# Patient Record
Sex: FEMALE | URBAN - METROPOLITAN AREA
[De-identification: names, ages, dates, MRNs, and addresses within clinical notes are randomized per-mention and may not be internally consistent; named-entity substitution may affect disease eponyms.]

---

## 2018-05-08 ENCOUNTER — APPOINTMENT (RX ONLY)
Dept: URBAN - METROPOLITAN AREA CLINIC 24 | Facility: CLINIC | Age: 43
Setting detail: DERMATOLOGY
End: 2018-05-08

## 2018-05-08 DIAGNOSIS — L71.0 PERIORAL DERMATITIS: ICD-10-CM

## 2018-05-08 PROBLEM — F41.9 ANXIETY DISORDER, UNSPECIFIED: Status: ACTIVE | Noted: 2018-05-08

## 2018-05-08 PROCEDURE — ? PRESCRIPTION

## 2018-05-08 PROCEDURE — ? COUNSELING

## 2018-05-08 PROCEDURE — ? TREATMENT REGIMEN

## 2018-05-08 PROCEDURE — 99202 OFFICE O/P NEW SF 15 MIN: CPT

## 2018-05-08 RX ORDER — DOXYCYCLINE HYCLATE 100 MG/1
CAPSULE, GELATIN COATED ORAL DAILY
Qty: 90 | Refills: 0 | Status: ERX | COMMUNITY
Start: 2018-05-08

## 2018-05-08 RX ADMIN — DOXYCYCLINE HYCLATE: 100 CAPSULE, GELATIN COATED ORAL at 00:00

## 2018-05-08 ASSESSMENT — LOCATION SIMPLE DESCRIPTION DERM: LOCATION SIMPLE: RIGHT CHEEK

## 2018-05-08 ASSESSMENT — LOCATION DETAILED DESCRIPTION DERM: LOCATION DETAILED: RIGHT MEDIAL BUCCAL CHEEK

## 2018-05-08 ASSESSMENT — LOCATION ZONE DERM: LOCATION ZONE: FACE

## 2018-05-08 NOTE — PROCEDURE: TREATMENT REGIMEN
Continue Regimen: Tretinoin 0.025% cream
Initiate Treatment: Doxycycline 100 mg once daily for three months
Plan: -Recommended patient use gentle fragrance free cleanser and moisturizer\\n-patient was given a goodrx  coupon for doxycycline to be used at B
Detail Level: Simple

## 2021-01-15 ENCOUNTER — APPOINTMENT (RX ONLY)
Dept: URBAN - METROPOLITAN AREA CLINIC 24 | Facility: CLINIC | Age: 46
Setting detail: DERMATOLOGY
End: 2021-01-15

## 2021-01-15 DIAGNOSIS — Z41.9 ENCOUNTER FOR PROCEDURE FOR PURPOSES OTHER THAN REMEDYING HEALTH STATE, UNSPECIFIED: ICD-10-CM

## 2021-01-15 PROCEDURE — ? COSMETIC CONSULTATION: SKIN CARE PRODUCTS AND SERVICES

## 2021-01-15 ASSESSMENT — LOCATION SIMPLE DESCRIPTION DERM
LOCATION SIMPLE: RIGHT CHEEK
LOCATION SIMPLE: LEFT CHEEK

## 2021-01-15 ASSESSMENT — LOCATION DETAILED DESCRIPTION DERM
LOCATION DETAILED: RIGHT CENTRAL MALAR CHEEK
LOCATION DETAILED: LEFT INFERIOR MEDIAL MALAR CHEEK

## 2021-01-15 ASSESSMENT — LOCATION ZONE DERM
LOCATION ZONE: FACE
LOCATION ZONE: FACE

## 2021-01-15 NOTE — HPI: COSMETIC CONSULTATION
Additional History: AM: cream cleanser, argon oil, facial scrub, ice bath, moisturizer spf 50\\nPM: make up remover, cleanser, moisturizer

## 2021-01-19 ENCOUNTER — APPOINTMENT (RX ONLY)
Dept: URBAN - METROPOLITAN AREA CLINIC 24 | Facility: CLINIC | Age: 46
Setting detail: DERMATOLOGY
End: 2021-01-19

## 2021-01-19 DIAGNOSIS — Z41.9 ENCOUNTER FOR PROCEDURE FOR PURPOSES OTHER THAN REMEDYING HEALTH STATE, UNSPECIFIED: ICD-10-CM

## 2021-01-19 PROCEDURE — ? BOTOX (U OR CC)

## 2021-01-19 ASSESSMENT — LOCATION DETAILED DESCRIPTION DERM
LOCATION DETAILED: LEFT SUPERIOR FOREHEAD
LOCATION DETAILED: LEFT LATERAL EYEBROW
LOCATION DETAILED: RIGHT FOREHEAD
LOCATION DETAILED: RIGHT SUPERIOR LATERAL MALAR CHEEK
LOCATION DETAILED: RIGHT MID TEMPLE
LOCATION DETAILED: LEFT FOREHEAD
LOCATION DETAILED: GLABELLA
LOCATION DETAILED: LEFT INFERIOR TEMPLE
LOCATION DETAILED: SUPERIOR MID FOREHEAD
LOCATION DETAILED: LEFT INFERIOR MEDIAL FOREHEAD
LOCATION DETAILED: RIGHT INFERIOR FOREHEAD
LOCATION DETAILED: RIGHT INFERIOR TEMPLE
LOCATION DETAILED: RIGHT INFERIOR MEDIAL FOREHEAD
LOCATION DETAILED: LEFT INFERIOR FOREHEAD

## 2021-01-19 ASSESSMENT — LOCATION SIMPLE DESCRIPTION DERM
LOCATION SIMPLE: LEFT TEMPLE
LOCATION SIMPLE: RIGHT CHEEK
LOCATION SIMPLE: LEFT EYEBROW
LOCATION SIMPLE: GLABELLA
LOCATION SIMPLE: LEFT FOREHEAD
LOCATION SIMPLE: RIGHT TEMPLE
LOCATION SIMPLE: RIGHT FOREHEAD
LOCATION SIMPLE: SUPERIOR FOREHEAD

## 2021-01-19 ASSESSMENT — LOCATION ZONE DERM: LOCATION ZONE: FACE

## 2021-01-19 NOTE — PROCEDURE: BOTOX (U OR CC)
Reconstitution Date (Optional): 1/5/21
Lot #: [id number]
Post-Care Instructions: Patient instructed to not lie down for 4 hours and limit physical activity for 24 hours. Patient instructed not to travel by airplane for 48 hours.
Measure In Units Or Cc's?: units
Expiration Date (Month Year): 08/2023
Detail Level: Detailed
Dilution (U/0.1 Cc): 10
Price Per Unit Or Per Cc In $ (Use Numbers Only, No Special Characters Or $): 13
Consent: Written consent obtained. Risks include but not limited to lid/brow ptosis, bruising, swelling, diplopia, temporary effect, incomplete chemical denervation.
Quantity Per Injection Site (Units Or Cc): 4
Quantity Per Injection Site (Units Or Cc): 2
Quantity Per Injection Site (Units Or Cc): 1

## 2021-02-04 ENCOUNTER — APPOINTMENT (RX ONLY)
Dept: URBAN - METROPOLITAN AREA CLINIC 24 | Facility: CLINIC | Age: 46
Setting detail: DERMATOLOGY
End: 2021-02-04

## 2021-02-04 DIAGNOSIS — Z41.9 ENCOUNTER FOR PROCEDURE FOR PURPOSES OTHER THAN REMEDYING HEALTH STATE, UNSPECIFIED: ICD-10-CM

## 2021-02-04 PROCEDURE — ? FILLERS (CC)

## 2021-02-04 ASSESSMENT — LOCATION ZONE DERM: LOCATION ZONE: FACE

## 2021-02-04 ASSESSMENT — LOCATION SIMPLE DESCRIPTION DERM
LOCATION SIMPLE: LEFT CHEEK
LOCATION SIMPLE: RIGHT TEMPLE
LOCATION SIMPLE: RIGHT CHEEK
LOCATION SIMPLE: LEFT ZYGOMA

## 2021-02-04 ASSESSMENT — LOCATION DETAILED DESCRIPTION DERM
LOCATION DETAILED: RIGHT CENTRAL MALAR CHEEK
LOCATION DETAILED: LEFT CENTRAL ZYGOMA
LOCATION DETAILED: RIGHT CENTRAL TEMPLE
LOCATION DETAILED: LEFT CENTRAL MALAR CHEEK

## 2021-02-04 NOTE — PROCEDURE: FILLERS (CC)
Marionette Lines Filler Volume In Cc: 0
Detail Level: Detailed
Nasolabial Folds Filler Volume In Cc: 1.5
Price (Use Numbers Only, No Special Characters Or $): 278
Post-Care Instructions: Patient instructed to apply ice to reduce swelling.
Expiration Date (Month Year): 09/30/2022
Topical Anesthesia?: BLT cream (benzocaine 20%, lidocaine 6%, tetracaine 4%)
Anesthesia Lot #: [id number]
Consent: Written consent obtained. Risks include but not limited to bruising, beading, irregular texture, ulceration, infection, allergic reaction, scar formation, incomplete augmentation, temporary nature, procedural pain and blindness.
Filler: Radiesse+
Anesthesia Expiration Date (Month Year): 03/17/21
Lot #: [id number]
Quantity Per Injection Site (Cc): 0.6 cc
Quantity Per Injection Site (Cc): 0.15 cc

## 2021-02-26 ENCOUNTER — APPOINTMENT (RX ONLY)
Dept: URBAN - METROPOLITAN AREA CLINIC 24 | Facility: CLINIC | Age: 46
Setting detail: DERMATOLOGY
End: 2021-02-26

## 2021-02-26 DIAGNOSIS — Z41.9 ENCOUNTER FOR PROCEDURE FOR PURPOSES OTHER THAN REMEDYING HEALTH STATE, UNSPECIFIED: ICD-10-CM

## 2021-02-26 PROCEDURE — ? PLATELET RICH PLASMA INJECTION

## 2021-02-26 ASSESSMENT — LOCATION ZONE DERM
LOCATION ZONE: FACE
LOCATION ZONE: FACE

## 2021-02-26 ASSESSMENT — LOCATION DETAILED DESCRIPTION DERM
LOCATION DETAILED: LEFT MEDIAL MALAR CHEEK
LOCATION DETAILED: RIGHT CENTRAL MALAR CHEEK

## 2021-02-26 ASSESSMENT — LOCATION SIMPLE DESCRIPTION DERM
LOCATION SIMPLE: RIGHT CHEEK
LOCATION SIMPLE: LEFT CHEEK

## 2021-02-26 NOTE — PROCEDURE: PLATELET RICH PLASMA INJECTION
Location #2: nose, eyes
Amount Injected At This Location In Cc (Will Not Render If 0): 0
Location #1: cheeks, chin
Location #4: upper lip
Location #3: forehead
Depth In Mm (Will Not Render If 0): 1.2
Show Additional Techniques: Yes
Detail Level: Zone
Depth In Mm (Will Not Render If 0): 0.5
Consent: Written consent obtained, risks reviewed including but not limited to pain, scarring, infection and incomplete improvement.  Patient understands the procedure is cosmetic in nature and will require out of pocket payment.
Indication Override: skin pen
Which Technique?: Default
Depth In Mm (Will Not Render If 0): 0.7
Amount Injected At This Location In Cc (Will Not Render If 0): 0.1
Depth In Mm (Will Not Render If 0): 1
Post-Care Instructions: After the procedure, take precautions agains sun exposure. Do not apply sunscreen for 12 hours after the procedure. Do not apply make-up for 12 hours after the procedure. Avoid alcohol based toners for 10-14 days. After 2-3 days patients can return to their regular skin regimen.
Amount Of Blood Collected (Optional - Include Units): 5 mil.

## 2021-04-13 ENCOUNTER — APPOINTMENT (RX ONLY)
Dept: URBAN - METROPOLITAN AREA CLINIC 24 | Facility: CLINIC | Age: 46
Setting detail: DERMATOLOGY
End: 2021-04-13

## 2021-04-13 DIAGNOSIS — Z41.9 ENCOUNTER FOR PROCEDURE FOR PURPOSES OTHER THAN REMEDYING HEALTH STATE, UNSPECIFIED: ICD-10-CM

## 2021-04-13 PROCEDURE — ? COSMETIC FOLLOW-UP

## 2021-04-13 PROCEDURE — ? TREATMENT REGIMEN

## 2021-04-13 PROCEDURE — ? COSMETIC QUOTE

## 2021-04-13 PROCEDURE — ? BOTOX (U OR CC)

## 2021-04-13 PROCEDURE — ? PHOTO-DOCUMENTATION

## 2021-04-13 ASSESSMENT — LOCATION DETAILED DESCRIPTION DERM
LOCATION DETAILED: RIGHT SUPERIOR LATERAL MALAR CHEEK
LOCATION DETAILED: RIGHT FOREHEAD
LOCATION DETAILED: RIGHT INFERIOR TEMPLE
LOCATION DETAILED: LEFT INFERIOR MEDIAL FOREHEAD
LOCATION DETAILED: RIGHT INFERIOR MEDIAL FOREHEAD
LOCATION DETAILED: GLABELLA
LOCATION DETAILED: NASAL DORSUM
LOCATION DETAILED: LEFT INFERIOR TEMPLE
LOCATION DETAILED: LEFT LATERAL EYEBROW
LOCATION DETAILED: SUPERIOR MID FOREHEAD
LOCATION DETAILED: LEFT SUPERIOR FOREHEAD
LOCATION DETAILED: LEFT FOREHEAD
LOCATION DETAILED: RIGHT INFERIOR FOREHEAD
LOCATION DETAILED: LEFT INFERIOR FOREHEAD
LOCATION DETAILED: RIGHT MID TEMPLE

## 2021-04-13 ASSESSMENT — LOCATION ZONE DERM
LOCATION ZONE: FACE
LOCATION ZONE: NOSE

## 2021-04-13 ASSESSMENT — LOCATION SIMPLE DESCRIPTION DERM
LOCATION SIMPLE: GLABELLA
LOCATION SIMPLE: LEFT TEMPLE
LOCATION SIMPLE: LEFT EYEBROW
LOCATION SIMPLE: NOSE
LOCATION SIMPLE: RIGHT FOREHEAD
LOCATION SIMPLE: SUPERIOR FOREHEAD
LOCATION SIMPLE: RIGHT CHEEK
LOCATION SIMPLE: RIGHT TEMPLE
LOCATION SIMPLE: LEFT FOREHEAD

## 2021-04-13 NOTE — PROCEDURE: COSMETIC QUOTE
Breast Procedure 9 Units: 0
Misc 6 Free Text Discount (In Dollars- Use Only Numbers And Decimals): 0.00
Injectable 1: Voluma
Include Sales Tax On Surgeon's Fees: Yes
Injectable 2 Murillo/Unit (In Dollars- Use Only Numbers And Decimals): 750.00
Send Charges To Patient Encounter: No
Injectable 3 Free Text Discount (In Dollars- Use Only Numbers And Decimals): 75.00
Facility Fee Units (Optional): 1
Injectable 1 Price/Unit (In Dollars- Use Only Numbers And Decimals): 876.00
Injectable 2: Vollure
Detail Level: Zone
Injectable 3: Volbella

## 2021-04-13 NOTE — PROCEDURE: BOTOX (U OR CC)
Reconstitution Date (Optional): 4/8/2021
Lot #: [id number]
Post-Care Instructions: Patient instructed to not lie down for 4 hours and limit physical activity for 24 hours. Patient instructed not to travel by airplane for 48 hours.
Measure In Units Or Cc's?: units
Expiration Date (Month Year): 12/2023
Detail Level: Detailed
Dilution (U/0.1 Cc): 10
Consent: Written consent obtained. Risks include but not limited to lid/brow ptosis, bruising, swelling, diplopia, temporary effect, incomplete chemical denervation.
Quantity Per Injection Site (Units Or Cc): 4
Quantity Per Injection Site (Units Or Cc): 2
Quantity Per Injection Site (Units Or Cc): 1

## 2021-04-13 NOTE — PROCEDURE: COSMETIC FOLLOW-UP
Side Effects Or Complications: None
Detail Level: Zone
Patient Satisfaction: Pleased
Global Improvement: Good
Comments (Free Text): Will schedule pt back for filler NLFs
Treatment (Optional): Filler Injection

## 2021-04-23 ENCOUNTER — APPOINTMENT (RX ONLY)
Dept: URBAN - METROPOLITAN AREA CLINIC 24 | Facility: CLINIC | Age: 46
Setting detail: DERMATOLOGY
End: 2021-04-23

## 2021-04-23 DIAGNOSIS — Z41.9 ENCOUNTER FOR PROCEDURE FOR PURPOSES OTHER THAN REMEDYING HEALTH STATE, UNSPECIFIED: ICD-10-CM

## 2021-04-23 PROCEDURE — ? SKINPEN

## 2021-04-23 ASSESSMENT — LOCATION ZONE DERM
LOCATION ZONE: FACE
LOCATION ZONE: FACE

## 2021-04-23 ASSESSMENT — LOCATION DETAILED DESCRIPTION DERM
LOCATION DETAILED: LEFT INFERIOR LATERAL MALAR CHEEK
LOCATION DETAILED: LEFT MEDIAL MALAR CHEEK
LOCATION DETAILED: RIGHT CENTRAL MALAR CHEEK

## 2021-04-23 ASSESSMENT — LOCATION SIMPLE DESCRIPTION DERM
LOCATION SIMPLE: RIGHT CHEEK
LOCATION SIMPLE: LEFT CHEEK

## 2021-04-23 NOTE — PROCEDURE: SKINPEN
Serum (Optional): PRP
Detail Level: Zone
Treatment Number (Optional): 3
Location #2: eyes, forehead, nose
Depth In Mm: 1
Post-Care Instructions: After the procedure, take precautions agains sun exposure. Do not apply sunscreen for 12 hours after the procedure. Do not apply make-up for 12 hours after the procedure. Avoid alcohol based toners for 10-14 days. After 2-3 days patients can return to their regular skin regimen.\\n\\n* mild- moderate erythema with slight pinpoint bleeding
Depth In Mm: 0.25
Location #4: spot scars
Depth In Mm: 0.5
Depth In Mm: 1.25
Depth In Mm: 1.5
Consent: Written consent obtained, risks reviewed including but not limited to pain, scarring, infection and incomplete improvement.  Patient understands the procedure is cosmetic in nature and will require out of pocket payment.
Location #3: upper lip
Location #1: cheeks, chin

## 2021-05-13 ENCOUNTER — APPOINTMENT (RX ONLY)
Dept: URBAN - METROPOLITAN AREA CLINIC 24 | Facility: CLINIC | Age: 46
Setting detail: DERMATOLOGY
End: 2021-05-13

## 2021-05-13 DIAGNOSIS — Z41.9 ENCOUNTER FOR PROCEDURE FOR PURPOSES OTHER THAN REMEDYING HEALTH STATE, UNSPECIFIED: ICD-10-CM

## 2021-05-13 PROCEDURE — ? FILLERS (CC)

## 2021-05-13 ASSESSMENT — LOCATION DETAILED DESCRIPTION DERM
LOCATION DETAILED: LEFT SUPERIOR VERMILION BORDER
LOCATION DETAILED: LEFT UPPER CUTANEOUS LIP
LOCATION DETAILED: LEFT NASOLABIAL FOLD
LOCATION DETAILED: RIGHT NASOLABIAL FOLD
LOCATION DETAILED: RIGHT INFERIOR MEDIAL MALAR CHEEK
LOCATION DETAILED: RIGHT ORAL COMMISSURE

## 2021-05-13 ASSESSMENT — LOCATION SIMPLE DESCRIPTION DERM
LOCATION SIMPLE: RIGHT CHEEK
LOCATION SIMPLE: LEFT LIP
LOCATION SIMPLE: RIGHT LIP
LOCATION SIMPLE: LEFT LIP
LOCATION SIMPLE: LEFT UPPER LIP

## 2021-05-13 ASSESSMENT — LOCATION ZONE DERM
LOCATION ZONE: FACE
LOCATION ZONE: LIP
LOCATION ZONE: LIP

## 2021-05-13 NOTE — PROCEDURE: FILLERS (CC)
Price (Use Numbers Only, No Special Characters Or $): 585
Vermilion Lips Filler Volume In Cc: 0
Expiration Date (Month Year): 12/26/2022
Post-Care Instructions: Patient instructed to apply ice to reduce swelling and to contact office if patient experiences significant pain or blanching of the skin. Patient was given provider's cell phone number for after hours contact.
Filler: Vollure
Anesthesia Expiration Date (Month Year): 06/29/2021
Detail Level: Detailed
Nasolabial Folds Filler Volume In Cc: 0.6
Consent: Written consent obtained. Risks include but not limited to bruising, beading, irregular texture, ulceration, blindness, infection, allergic reaction, scar formation, incomplete augmentation, temporary nature, procedural pain.
Lot #: [id number]
Marionette Lines Filler Volume In Cc: 0.1
Anesthesia Lot #: [id number]
Topical Anesthesia?: 20% benzocaine, 8% lidocaine, 4% tetracaine
Quantity Per Injection Site (Cc): 0.3 cc
Quantity Per Injection Site (Cc): 0.05 cc

## 2021-05-27 ENCOUNTER — APPOINTMENT (RX ONLY)
Dept: URBAN - METROPOLITAN AREA CLINIC 24 | Facility: CLINIC | Age: 46
Setting detail: DERMATOLOGY
End: 2021-05-27

## 2021-05-27 DIAGNOSIS — Z41.9 ENCOUNTER FOR PROCEDURE FOR PURPOSES OTHER THAN REMEDYING HEALTH STATE, UNSPECIFIED: ICD-10-CM

## 2021-05-27 PROCEDURE — ? OTHER

## 2021-05-27 NOTE — PROCEDURE: OTHER
Render Risk Assessment In Note?: no
Detail Level: Zone
Other (Free Text): Patient rescheduled due to event this weekend.
Note Text (......Xxx Chief Complaint.): This diagnosis correlates with the

## 2021-05-28 ENCOUNTER — RX ONLY (OUTPATIENT)
Age: 46
Setting detail: RX ONLY
End: 2021-05-28

## 2021-05-28 RX ORDER — DOXYCYCLINE 40 MG/1
1 CAPSULE ORAL QAM
Qty: 30 | Refills: 11 | Status: ERX | COMMUNITY
Start: 2021-05-28

## 2021-05-28 RX ORDER — AZELAIC ACID 0.15 G/G
1 AEROSOL, FOAM TOPICAL BID
Qty: 1 | Refills: 11 | Status: ERX | COMMUNITY
Start: 2021-05-28

## 2021-06-01 ENCOUNTER — APPOINTMENT (RX ONLY)
Dept: URBAN - METROPOLITAN AREA CLINIC 24 | Facility: CLINIC | Age: 46
Setting detail: DERMATOLOGY
End: 2021-06-01

## 2021-06-01 DIAGNOSIS — Z41.9 ENCOUNTER FOR PROCEDURE FOR PURPOSES OTHER THAN REMEDYING HEALTH STATE, UNSPECIFIED: ICD-10-CM

## 2021-06-01 DIAGNOSIS — Z71.89 OTHER SPECIFIED COUNSELING: ICD-10-CM

## 2021-06-01 PROCEDURE — ? COUNSELING

## 2021-06-01 PROCEDURE — ? SUNSCREEN RECOMMENDATIONS

## 2021-06-01 PROCEDURE — ? FILLERS (CC)

## 2021-06-01 ASSESSMENT — LOCATION DETAILED DESCRIPTION DERM
LOCATION DETAILED: LEFT SUPERIOR VERMILION LIP
LOCATION DETAILED: LEFT INFERIOR VERMILION LIP
LOCATION DETAILED: LEFT PHILTRAL RIDGE
LOCATION DETAILED: LEFT ORAL COMMISSURE
LOCATION DETAILED: RIGHT ORAL COMMISSURE
LOCATION DETAILED: RIGHT PHILTRAL RIDGE

## 2021-06-01 ASSESSMENT — LOCATION SIMPLE DESCRIPTION DERM
LOCATION SIMPLE: RIGHT LIP
LOCATION SIMPLE: LEFT LIP

## 2021-06-01 ASSESSMENT — LOCATION ZONE DERM: LOCATION ZONE: LIP

## 2021-06-01 NOTE — PROCEDURE: SUNSCREEN RECOMMENDATIONS
Products Recommended: CeraVe SPF 50 Moisturizing Lotion\\nSkinBetter Sports Stick\\nColorescience Sunforgettable [place] SPF 50
Detail Level: Detailed
General Sunscreen Counseling: I recommended a broad spectrum sunscreen with a SPF of 50 or higher.  I explained that SPF 50 sunscreens block approximately 97 percent of the sun's harmful rays.  Chemical sunscreens should be applied at least 15 minutes prior to expected sun exposure (mineral/physical sunscreens work immediately) and then every 2 hours after that as long as sun exposure continues. If swimming or exercising sunscreen should be reapplied every 45 minutes to an hour after getting wet or sweating.  One ounce, or the equivalent of a shot glass full of sunscreen, is adequate to protect the skin not covered by a bathing suit. I also recommended a lip balm with a sunscreen as well. Sun protective clothing can be used in lieu of sunscreen but must be worn the entire time you are exposed to the sun's rays.

## 2021-06-01 NOTE — PROCEDURE: FILLERS (CC)
Vermilion Lips Filler Volume In Cc: 1
Expiration Date (Month Year): 12/06/2022
Post-Care Instructions: Patient instructed to apply ice to reduce swelling and to contact office if patient experiences significant pain or blanching of the skin.
Additional Anesthesia Volume In Cc: 0
Price (Use Numbers Only, No Special Characters Or $): 503
Filler: Juvederm Volbella XC
Topical Anesthesia?: BLT cream (benzocaine 20%, lidocaine 6%, tetracaine 4%)
Consent: Written consent obtained. Risks include but not limited to bruising, beading, irregular texture, ulceration, blindness, infection, allergic reaction, scar formation, incomplete augmentation, temporary nature, procedural pain.
Lot #: [id number]
Detail Level: Detailed
Quantity Per Injection Site (Cc): 0.05 cc
Additional Area 1 Volume In Cc: 0.3
Additional Area 1 Location: [facility]
Expiration Date (Month Year): 12/26/2022
Lot #: [id number]
Filler: Juvederm Vollure XC
Quantity Per Injection Site (Cc): 0.15 cc
Quantity Per Injection Site (Cc): 0.2 cc

## 2021-06-01 NOTE — PROCEDURE: MIPS QUALITY
Quality 110: Preventive Care And Screening: Influenza Immunization: Influenza Immunization Administered during Influenza season
Detail Level: Detailed
Quality 226: Preventive Care And Screening: Tobacco Use: Screening And Cessation Intervention: Patient screened for tobacco use and is an ex/non-smoker
Quality 130: Documentation Of Current Medications In The Medical Record: Current Medications Documented
Quality 431: Preventive Care And Screening: Unhealthy Alcohol Use - Screening: Patient screened for unhealthy alcohol use using a single question and scores less than 2 times per year
no

## 2021-06-10 ENCOUNTER — APPOINTMENT (RX ONLY)
Dept: URBAN - METROPOLITAN AREA CLINIC 24 | Facility: CLINIC | Age: 46
Setting detail: DERMATOLOGY
End: 2021-06-10

## 2021-06-10 DIAGNOSIS — Z41.9 ENCOUNTER FOR PROCEDURE FOR PURPOSES OTHER THAN REMEDYING HEALTH STATE, UNSPECIFIED: ICD-10-CM

## 2021-06-10 PROCEDURE — ? PHOTO-DOCUMENTATION

## 2021-07-08 ENCOUNTER — APPOINTMENT (RX ONLY)
Dept: URBAN - METROPOLITAN AREA CLINIC 24 | Facility: CLINIC | Age: 46
Setting detail: DERMATOLOGY
End: 2021-07-08

## 2021-07-08 DIAGNOSIS — Z71.89 OTHER SPECIFIED COUNSELING: ICD-10-CM

## 2021-07-08 DIAGNOSIS — Z41.9 ENCOUNTER FOR PROCEDURE FOR PURPOSES OTHER THAN REMEDYING HEALTH STATE, UNSPECIFIED: ICD-10-CM

## 2021-07-08 PROCEDURE — ? COUNSELING

## 2021-07-08 PROCEDURE — ? BOTOX (U OR CC)

## 2021-07-08 PROCEDURE — ? SUNSCREEN RECOMMENDATIONS

## 2021-07-08 ASSESSMENT — LOCATION SIMPLE DESCRIPTION DERM
LOCATION SIMPLE: SUPERIOR FOREHEAD
LOCATION SIMPLE: NOSE
LOCATION SIMPLE: LEFT TEMPLE
LOCATION SIMPLE: RIGHT FOREHEAD
LOCATION SIMPLE: GLABELLA
LOCATION SIMPLE: LEFT EYEBROW
LOCATION SIMPLE: RIGHT CHEEK
LOCATION SIMPLE: LEFT FOREHEAD
LOCATION SIMPLE: RIGHT TEMPLE

## 2021-07-08 ASSESSMENT — LOCATION ZONE DERM
LOCATION ZONE: NOSE
LOCATION ZONE: FACE

## 2021-07-08 ASSESSMENT — LOCATION DETAILED DESCRIPTION DERM
LOCATION DETAILED: RIGHT MID TEMPLE
LOCATION DETAILED: NASAL DORSUM
LOCATION DETAILED: RIGHT INFERIOR TEMPLE
LOCATION DETAILED: LEFT LATERAL EYEBROW
LOCATION DETAILED: LEFT INFERIOR MEDIAL FOREHEAD
LOCATION DETAILED: LEFT INFERIOR TEMPLE
LOCATION DETAILED: RIGHT INFERIOR MEDIAL FOREHEAD
LOCATION DETAILED: LEFT FOREHEAD
LOCATION DETAILED: GLABELLA
LOCATION DETAILED: RIGHT SUPERIOR LATERAL MALAR CHEEK
LOCATION DETAILED: RIGHT FOREHEAD
LOCATION DETAILED: LEFT SUPERIOR FOREHEAD
LOCATION DETAILED: SUPERIOR MID FOREHEAD
LOCATION DETAILED: RIGHT INFERIOR FOREHEAD
LOCATION DETAILED: LEFT INFERIOR FOREHEAD

## 2021-07-08 NOTE — PROCEDURE: SUNSCREEN RECOMMENDATIONS
Products Recommended: CeraVe SPF 50 Moisturizing Lotion\\nSkinBetter Sports Stick\\nColorescience Sunforgettable [place] SPF 50
Detail Level: Detailed
General Sunscreen Counseling: I recommended a broad spectrum sunscreen with a SPF of 50 or higher.  I explained that SPF 50 sunscreens block approximately 97 percent of the sun's harmful rays.  Chemical sunscreens should be applied at least 15 minutes prior to expected sun exposure (mineral/physical sunscreens work immediately) and then every 2 hours after that as long as sun exposure continues. If swimming or exercising sunscreen should be reapplied every 45 minutes to an hour after getting wet or sweating.  One ounce, or the equivalent of a shot glass full of sunscreen, is adequate to protect the skin not covered by a bathing suit. I also recommended a lip balm with a sunscreen as well. Sun protective clothing can be used in lieu of sunscreen but must be worn the entire time you are exposed to the sun's rays.

## 2021-07-08 NOTE — PROCEDURE: BOTOX (U OR CC)
Reconstitution Date (Optional): 6/10/2021
Lot #: [id number]
Post-Care Instructions: Patient instructed to not lie down for 4 hours and limit physical activity for 24 hours. Patient instructed not to travel by airplane for 48 hours.
Measure In Units Or Cc's?: units
Expiration Date (Month Year): 9/30/2023
Detail Level: Detailed
Dilution (U/0.1 Cc): 10
Price Per Unit Or Per Cc In $ (Use Numbers Only, No Special Characters Or $): 13
Consent: Written consent obtained. Risks include but not limited to lid/brow ptosis, bruising, swelling, diplopia, temporary effect, incomplete chemical denervation.
Quantity Per Injection Site (Units Or Cc): 4
Quantity Per Injection Site (Units Or Cc): 2
Quantity Per Injection Site (Units Or Cc): 1

## 2021-07-13 ENCOUNTER — APPOINTMENT (RX ONLY)
Dept: URBAN - METROPOLITAN AREA CLINIC 24 | Facility: CLINIC | Age: 46
Setting detail: DERMATOLOGY
End: 2021-07-13

## 2021-07-13 DIAGNOSIS — Z41.9 ENCOUNTER FOR PROCEDURE FOR PURPOSES OTHER THAN REMEDYING HEALTH STATE, UNSPECIFIED: ICD-10-CM

## 2021-07-13 PROCEDURE — ? MICRONEEDLING

## 2021-07-13 PROCEDURE — ? PLATELET RICH PLASMA INJECTION

## 2021-07-13 ASSESSMENT — LOCATION ZONE DERM
LOCATION ZONE: FACE
LOCATION ZONE: FACE

## 2021-07-13 ASSESSMENT — LOCATION DETAILED DESCRIPTION DERM
LOCATION DETAILED: RIGHT INFERIOR CENTRAL MALAR CHEEK
LOCATION DETAILED: RIGHT INFERIOR MEDIAL MALAR CHEEK
LOCATION DETAILED: LEFT INFERIOR MEDIAL MALAR CHEEK
LOCATION DETAILED: RIGHT CENTRAL MALAR CHEEK

## 2021-07-13 ASSESSMENT — LOCATION SIMPLE DESCRIPTION DERM
LOCATION SIMPLE: LEFT CHEEK
LOCATION SIMPLE: RIGHT CHEEK
LOCATION SIMPLE: RIGHT CHEEK

## 2021-07-13 NOTE — PROCEDURE: PLATELET RICH PLASMA INJECTION
Depth In Mm (Will Not Render If 0): 0
Depth In Mm (Will Not Render If 0): 1
Depth In Mm (Will Not Render If 0): 0.5
Amount Of Blood Processed (Optional - Include Units): 1 tube
Depth In Mm (Will Not Render If 0): 0.3
Detail Level: Zone
Depth In Mm (Will Not Render If 0): 1.2
Consent: Written consent obtained, risks reviewed including but not limited to pain, scarring, infection and incomplete improvement.  Patient understands the procedure is cosmetic in nature and will require out of pocket payment.
Amount Injected At This Location In Cc (Will Not Render If 0): 0.1
Which Technique?: Default
Show Additional Techniques: Yes
Post-Care Instructions: After the procedure, take precautions agains sun exposure. Do not apply sunscreen for 12 hours after the procedure. Do not apply make-up for 12 hours after the procedure. Avoid alcohol based toners for 10-14 days. After 2-3 days patients can return to their regular skin regimen.
Treatment Number (Optional): 4

## 2021-07-13 NOTE — PROCEDURE: MICRONEEDLING
Depth In Mm (Location #3): 0.75
Location #2: forehead, nose, eyes
Infusions (Optional): hyaluronic acid
Post-Care Instructions: After the procedure, take precautions agains sun exposure. Do not apply sunscreen for 12 hours after the procedure. Do not apply make-up for 12 hours after the procedure. Avoid alcohol based toners for 10-14 days. After 2-3 days patients can return to their regular skin\\n*moderate erythema, slight pinpoint bleeding
Depth In Mm (Location #5): 1
Depth In Mm (Location #2): 0.5
Location #1: cheeks, chin
Topical Anesthesia?: BLT cream (benzocaine 20%, lidocaine 6%, tetracaine 4%)
Length Of Topical Anesthesia Application (Optional): 30 minutes
Consent: Written consent obtained, risks reviewed including but not limited to pain, scarring, infection and incomplete improvement.  Patient understands the procedure is cosmetic in nature and will require out of pocket\\n\\n*pt. Had even, moderate erythema and moderate pin point bleeding.
Detail Level: Zone
Treatment Number (Optional): 4
Location #3: upper lip
Depth In Mm (Location #1): 1.25

## 2021-09-02 ENCOUNTER — APPOINTMENT (RX ONLY)
Dept: URBAN - METROPOLITAN AREA CLINIC 24 | Facility: CLINIC | Age: 46
Setting detail: DERMATOLOGY
End: 2021-09-02

## 2021-09-02 DIAGNOSIS — Z41.9 ENCOUNTER FOR PROCEDURE FOR PURPOSES OTHER THAN REMEDYING HEALTH STATE, UNSPECIFIED: ICD-10-CM

## 2021-09-02 PROCEDURE — ? PHOTO-DOCUMENTATION

## 2021-09-02 PROCEDURE — ? BOTOX (U OR CC)

## 2021-09-02 ASSESSMENT — LOCATION DETAILED DESCRIPTION DERM
LOCATION DETAILED: LEFT LATERAL EYEBROW
LOCATION DETAILED: LEFT SUPERIOR FOREHEAD
LOCATION DETAILED: LEFT INFERIOR MEDIAL FOREHEAD
LOCATION DETAILED: RIGHT MID TEMPLE
LOCATION DETAILED: RIGHT INFERIOR FOREHEAD
LOCATION DETAILED: RIGHT INFERIOR MEDIAL FOREHEAD
LOCATION DETAILED: GLABELLA
LOCATION DETAILED: NASAL DORSUM
LOCATION DETAILED: LEFT INFERIOR FOREHEAD
LOCATION DETAILED: SUPERIOR MID FOREHEAD
LOCATION DETAILED: RIGHT INFERIOR TEMPLE
LOCATION DETAILED: LEFT FOREHEAD
LOCATION DETAILED: RIGHT SUPERIOR LATERAL MALAR CHEEK
LOCATION DETAILED: LEFT INFERIOR TEMPLE
LOCATION DETAILED: RIGHT FOREHEAD

## 2021-09-02 ASSESSMENT — LOCATION ZONE DERM
LOCATION ZONE: FACE
LOCATION ZONE: NOSE

## 2021-09-02 ASSESSMENT — LOCATION SIMPLE DESCRIPTION DERM
LOCATION SIMPLE: LEFT TEMPLE
LOCATION SIMPLE: LEFT FOREHEAD
LOCATION SIMPLE: LEFT EYEBROW
LOCATION SIMPLE: SUPERIOR FOREHEAD
LOCATION SIMPLE: NOSE
LOCATION SIMPLE: RIGHT FOREHEAD
LOCATION SIMPLE: GLABELLA
LOCATION SIMPLE: RIGHT CHEEK
LOCATION SIMPLE: RIGHT TEMPLE

## 2021-09-02 NOTE — PROCEDURE: BOTOX (U OR CC)
Reconstitution Date (Optional): 09/02/2021
Lot #: [id number]
Post-Care Instructions: Patient instructed to not lie down for 4 hours and limit physical activity for 24 hours. Patient instructed not to travel by airplane for 48 hours.
Measure In Units Or Cc's?: units
Expiration Date (Month Year): 10/2023
Detail Level: Detailed
Dilution (U/0.1 Cc): 10
Price Per Unit Or Per Cc In $ (Use Numbers Only, No Special Characters Or $): 13
Consent: Written consent obtained. Risks include but not limited to lid/brow ptosis, bruising, swelling, diplopia, temporary effect, incomplete chemical denervation.
Quantity Per Injection Site (Units Or Cc): 4
Quantity Per Injection Site (Units Or Cc): 2
Quantity Per Injection Site (Units Or Cc): 1

## 2021-09-02 NOTE — HPI: FOLLOW UP BOTOX
Have You Had Botox?: has had prior Botox
Have You Had Dysport?: has not had Dysport
Have You Had Xeomin?: has not had Xeomin
When Was Your Last Botox Treatment?: July 9, 2021

## 2021-10-05 ENCOUNTER — APPOINTMENT (RX ONLY)
Dept: URBAN - METROPOLITAN AREA CLINIC 24 | Facility: CLINIC | Age: 46
Setting detail: DERMATOLOGY
End: 2021-10-05

## 2021-10-05 DIAGNOSIS — Z41.9 ENCOUNTER FOR PROCEDURE FOR PURPOSES OTHER THAN REMEDYING HEALTH STATE, UNSPECIFIED: ICD-10-CM

## 2021-10-05 PROCEDURE — ? FACIAL

## 2021-10-05 ASSESSMENT — LOCATION DETAILED DESCRIPTION DERM
LOCATION DETAILED: LEFT INFERIOR MEDIAL MALAR CHEEK
LOCATION DETAILED: NASAL TIP
LOCATION DETAILED: RIGHT SUPERIOR MEDIAL BUCCAL CHEEK
LOCATION DETAILED: RIGHT MEDIAL MALAR CHEEK
LOCATION DETAILED: LEFT INFERIOR MEDIAL FOREHEAD
LOCATION DETAILED: LEFT INFERIOR MEDIAL FOREHEAD

## 2021-10-05 ASSESSMENT — LOCATION SIMPLE DESCRIPTION DERM
LOCATION SIMPLE: LEFT FOREHEAD
LOCATION SIMPLE: NOSE
LOCATION SIMPLE: LEFT CHEEK
LOCATION SIMPLE: RIGHT CHEEK
LOCATION SIMPLE: RIGHT CHEEK
LOCATION SIMPLE: LEFT FOREHEAD

## 2021-10-05 ASSESSMENT — LOCATION ZONE DERM
LOCATION ZONE: NOSE
LOCATION ZONE: FACE
LOCATION ZONE: FACE

## 2021-10-05 NOTE — PROCEDURE: FACIAL
Treatment Type (Optional): Anti Aging Facial
Comments (Sticky): *The face was washed with ageless cleanser, warm towel off, pumpkin mask, warm towel off, light extractions, equalizing toner,lactic  peel, cucumber spritz, vital c mask, cool towel off, Ce ferulic, phyto gel, epidermal repair, uv fusion\\n\\n* purchased 1 facial, 2 bbl, 1 mary alice peel, 1 prp
Exfoliation Type: enzyme
Detail Level: Zone
Extraction Method: cotton-tipped applicator
Mask Type (Optional): gel based

## 2021-11-09 ENCOUNTER — APPOINTMENT (RX ONLY)
Dept: URBAN - METROPOLITAN AREA CLINIC 24 | Facility: CLINIC | Age: 46
Setting detail: DERMATOLOGY
End: 2021-11-09

## 2021-11-09 DIAGNOSIS — Z41.9 ENCOUNTER FOR PROCEDURE FOR PURPOSES OTHER THAN REMEDYING HEALTH STATE, UNSPECIFIED: ICD-10-CM

## 2021-11-09 PROCEDURE — ? SCITON BBL

## 2021-11-09 ASSESSMENT — LOCATION ZONE DERM
LOCATION ZONE: FACE
LOCATION ZONE: FACE

## 2021-11-09 ASSESSMENT — LOCATION SIMPLE DESCRIPTION DERM
LOCATION SIMPLE: RIGHT CHEEK
LOCATION SIMPLE: RIGHT CHEEK

## 2021-11-09 ASSESSMENT — LOCATION DETAILED DESCRIPTION DERM
LOCATION DETAILED: RIGHT INFERIOR CENTRAL MALAR CHEEK
LOCATION DETAILED: RIGHT INFERIOR CENTRAL MALAR CHEEK

## 2021-11-09 NOTE — PROCEDURE: SCITON BBL
Cooling (In C): 25
Cooling (In C): 15
Repetition Rate (Hz): 10
Fluence (J/Cm2): 14
Prepaid Number Of Treatments: 1
Pulse Duration: 20
Preprocedure Text: The treatment areas were thoroughly cleaned. Any exposed at risk hair that was not to be treated was covered in white paper tape. Clear ultrasound gel was applied to the treatment area. The area was treated with no immediate stacking of pulses.\\n\\nCOVID screening performed prior to patient entering building.\\nPatient notified of safe practice measures for COVID-19, also posted throughout office.\\nPatient wearing mask. Mask removed to examine face only.\\n\\n*pt. Declined use of steroid or anti-biotic in past 7 days. No sun exposure for 2 weeks.
Pulse Duration Units: milliseconds
Anesthesia Volume In Cc: 0
Detail Level: Zone
Spot Size: Finesse Adapter Size: 15 x 15 mm square
Passes: 2
Post Procedure Text: The patient tolerated the procedure well. Ice-chilled washclothes were applied to the treatment area for comfort. Post care was reviewed with the patient.
Consent: Written consent obtained, risks reviewed including but not limited to crusting, scabbing, blistering, scarring, darker or lighter pigmentary change, bruising, and/or incomplete response
Device Serial Number (Optional): [id number], [id number], Vanessa
Post-Care Instructions: I reviewed with the patient in detail post-care instructions. Patient should stay away from the sun and wear sun protection until treated areas are fully healed.\\n\\n*no residual heat.
Spot Size: Finesse Adapter Size: 11 mm round
Cooling ?: Yes
Hide Repetition Rate?: No

## 2021-12-07 ENCOUNTER — APPOINTMENT (RX ONLY)
Dept: URBAN - METROPOLITAN AREA CLINIC 24 | Facility: CLINIC | Age: 46
Setting detail: DERMATOLOGY
End: 2021-12-07

## 2021-12-07 DIAGNOSIS — Z41.9 ENCOUNTER FOR PROCEDURE FOR PURPOSES OTHER THAN REMEDYING HEALTH STATE, UNSPECIFIED: ICD-10-CM

## 2021-12-07 PROCEDURE — ? SCITON BBL

## 2021-12-07 ASSESSMENT — LOCATION DETAILED DESCRIPTION DERM
LOCATION DETAILED: RIGHT INFERIOR CENTRAL MALAR CHEEK
LOCATION DETAILED: LEFT CENTRAL MALAR CHEEK

## 2021-12-07 ASSESSMENT — LOCATION ZONE DERM
LOCATION ZONE: FACE
LOCATION ZONE: FACE

## 2021-12-07 ASSESSMENT — LOCATION SIMPLE DESCRIPTION DERM
LOCATION SIMPLE: LEFT CHEEK
LOCATION SIMPLE: RIGHT CHEEK

## 2021-12-07 NOTE — PROCEDURE: SCITON BBL
Preprocedure Text: The treatment areas were thoroughly cleaned. Any exposed at risk hair that was not to be treated was covered in white paper tape. Clear ultrasound gel was applied to the treatment area. The area was treated with no immediate stacking of pulses.\\n\\nCOVID screening performed prior to patient entering building.\\nPatient notified of safe practice measures for COVID-19, also posted throughout office.\\nPatient wearing mask. Mask removed to examine face only.\\n\\n*pt. Declined use of steroid or anti-biotic in past 7 days. No sun exposure for 2 weeks.
Passes: 1
Pulse Duration Units: milliseconds
Fluence (J/Cm2): 25
Post Procedure Text: The patient tolerated the procedure well. Ice-chilled washclothes were applied to the treatment area for comfort. Post care was reviewed with the patient.
Pulse Duration: 20
Cooling ?: Yes
Post-Care Instructions: I reviewed with the patient in detail post-care instructions. Patient should stay away from the sun and wear sun protection until treated areas are fully healed.\\n\\n*no residual heat.
Spot Size: Finesse Adapter Size: 15 x 15 mm square
Hide Repetition Rate?: No
Detail Level: Zone
Repetition Rate (Hz): 10
Spot Size: Finesse Adapter Size: 11 mm round
Cooling (In C): 15
Device Serial Number (Optional): [id number], [id number], Vanessa
Fluence (J/Cm2): 14
Anesthesia Volume In Cc: 0
Consent: Written consent obtained, risks reviewed including but not limited to crusting, scabbing, blistering, scarring, darker or lighter pigmentary change, bruising, and/or incomplete response

## 2022-03-15 ENCOUNTER — APPOINTMENT (RX ONLY)
Dept: URBAN - METROPOLITAN AREA CLINIC 24 | Facility: CLINIC | Age: 47
Setting detail: DERMATOLOGY
End: 2022-03-15

## 2022-03-15 DIAGNOSIS — Z41.9 ENCOUNTER FOR PROCEDURE FOR PURPOSES OTHER THAN REMEDYING HEALTH STATE, UNSPECIFIED: ICD-10-CM

## 2022-03-15 PROCEDURE — ? SKINPEN

## 2022-03-15 ASSESSMENT — LOCATION SIMPLE DESCRIPTION DERM
LOCATION SIMPLE: RIGHT CHEEK
LOCATION SIMPLE: LEFT CHEEK

## 2022-03-15 ASSESSMENT — LOCATION ZONE DERM
LOCATION ZONE: FACE
LOCATION ZONE: FACE

## 2022-03-15 ASSESSMENT — LOCATION DETAILED DESCRIPTION DERM
LOCATION DETAILED: LEFT INFERIOR MEDIAL MALAR CHEEK
LOCATION DETAILED: RIGHT INFERIOR CENTRAL MALAR CHEEK

## 2022-03-15 NOTE — PROCEDURE: SKINPEN
Consent: Written consent obtained, risks reviewed including but not limited to pain, scarring, infection and incomplete improvement.  Patient understands the procedure is cosmetic in nature and will require out of pocket payment.
Location #1: cheeks
Depth In Mm: 1
Location #4: nose
Location #2: chin
Detail Level: Zone
Depth In Mm: 0.25
Depth In Mm: 0.75
Serum (Optional): PRP
Depth In Mm: 1.5
Post-Care Instructions: After the procedure, take precautions agains sun exposure. Do not apply sunscreen for 12 hours after the procedure. Do not apply make-up for 12 hours after the procedure. Avoid alcohol based toners for 10-14 days. After 2-3 days patients can return to their regular skin regimen.\\n\\n*moderate erythema with slight pinpoint bleeding
Location #3: forehead, eyes
Treatment Number (Optional): 3
Depth In Mm: 0.5

## 2022-04-22 ENCOUNTER — APPOINTMENT (RX ONLY)
Dept: URBAN - METROPOLITAN AREA TELEMEDICINE 2 | Facility: TELEMEDICINE | Age: 47
Setting detail: DERMATOLOGY
End: 2022-04-22

## 2022-04-22 DIAGNOSIS — Z41.9 ENCOUNTER FOR PROCEDURE FOR PURPOSES OTHER THAN REMEDYING HEALTH STATE, UNSPECIFIED: ICD-10-CM

## 2022-04-22 PROCEDURE — ? SCITON MICROLASERPEEL

## 2022-04-22 ASSESSMENT — LOCATION SIMPLE DESCRIPTION DERM
LOCATION SIMPLE: RIGHT CHEEK
LOCATION SIMPLE: LEFT CHEEK

## 2022-04-22 ASSESSMENT — LOCATION DETAILED DESCRIPTION DERM
LOCATION DETAILED: RIGHT CENTRAL MALAR CHEEK
LOCATION DETAILED: LEFT MEDIAL MALAR CHEEK

## 2022-04-22 ASSESSMENT — LOCATION ZONE DERM
LOCATION ZONE: FACE
LOCATION ZONE: FACE

## 2022-04-22 NOTE — PROCEDURE: SCITON MICROLASERPEEL
Laser Type: Er:YAG 2940 nm
External Cooling Fan Speed: 6
Consent: Written consent obtained, risks reviewed including but not limited to crusting, scabbing, blistering, scarring, darker or lighter pigmentary change, incomplete improvement of dyschromia, wrinkles, prolonged erythema and facial swelling, infection and bleeding.
External Cooling: Andrew Cryo 6
Pre=procedure Text: After consent was obtained the treatment areas were cleaned and treated using the parameters noted above.\\n\\n* pt. Reported no pain, just heat, set with iona for 10 minutes. Left with extra bio mask, healing balm sample, and day by day instructions
Post-Care Instructions: I reviewed with the patient in detail post-care instructions. Patient should avoid sun until area fully healed. Pt should apply vaseline to treated areas, and remove crusts gently with water-vinegar soaks.\\n\\n*2 passes over area between breasts\\n* pt. Reported no discomfort \\n*1/2
Percent Overlap: 30 %
Treatment Number: 1
Ablation Depth In Micrometers: 8
Detail Level: Detailed